# Patient Record
Sex: MALE | Race: WHITE
[De-identification: names, ages, dates, MRNs, and addresses within clinical notes are randomized per-mention and may not be internally consistent; named-entity substitution may affect disease eponyms.]

---

## 2017-07-02 ENCOUNTER — HOSPITAL ENCOUNTER (EMERGENCY)
Dept: HOSPITAL 53 - M ED | Age: 47
Discharge: HOME | End: 2017-07-02
Payer: OTHER GOVERNMENT

## 2017-07-02 VITALS — BODY MASS INDEX: 26.5 KG/M2 | WEIGHT: 174.83 LBS | HEIGHT: 68 IN

## 2017-07-02 VITALS — SYSTOLIC BLOOD PRESSURE: 117 MMHG | DIASTOLIC BLOOD PRESSURE: 66 MMHG

## 2017-07-02 DIAGNOSIS — H10.31: ICD-10-CM

## 2017-07-02 DIAGNOSIS — B34.9: Primary | ICD-10-CM

## 2017-07-02 LAB
BASOPHILS # BLD AUTO: 0.1 K/MM3 (ref 0–0.2)
BASOPHILS NFR BLD AUTO: 0.9 % (ref 0–1)
CONTROL LINE MONO: (no result)
EOSINOPHIL # BLD AUTO: 0.2 K/MM3 (ref 0–0.5)
EOSINOPHIL NFR BLD AUTO: 1.8 % (ref 0–3)
ERYTHROCYTE [DISTWIDTH] IN BLOOD BY AUTOMATED COUNT: 12.5 % (ref 11.5–14.5)
LARGE UNSTAINED CELL #: 0.1 K/MM3 (ref 0–0.4)
LARGE UNSTAINED CELL %: 0.7 % (ref 0–4)
LYMPHOCYTES # BLD AUTO: 1.6 K/MM3 (ref 1.5–4.5)
LYMPHOCYTES NFR BLD AUTO: 17.5 % (ref 24–44)
MCH RBC QN AUTO: 31.3 PG (ref 27–33)
MCHC RBC AUTO-ENTMCNC: 34 G/DL (ref 32–36.5)
MCV RBC AUTO: 92.2 FL (ref 80–96)
MONOCYTES # BLD AUTO: 0.3 K/MM3 (ref 0–0.8)
MONOCYTES NFR BLD AUTO: 3.7 % (ref 0–5)
NEUTROPHILS # BLD AUTO: 6.5 K/MM3 (ref 1.8–7.7)
NEUTROPHILS NFR BLD AUTO: 75.4 % (ref 36–66)
PLATELET # BLD AUTO: 250 K/MM3 (ref 150–450)
WBC # BLD AUTO: 8.7 K/MM3 (ref 4–10)

## 2017-12-21 ENCOUNTER — HOSPITAL ENCOUNTER (OUTPATIENT)
Dept: HOSPITAL 53 - M CARPUL | Age: 47
End: 2017-12-21
Payer: OTHER GOVERNMENT

## 2017-12-21 DIAGNOSIS — R93.1: Primary | ICD-10-CM

## 2017-12-21 NOTE — ECHO
DATE OF PROCEDURE: 12/21/2017

 

AGE: 47

GENDER: Male

HEIGHT: 69 inches

WEIGHT: 178 pounds

BODY SURFACE AREA: 1.97 m2

 

PATIENT LOCATION: Outpatient

 

REFERRING PHYSICIAN:   James Michael MD

 

INDICATION: Right atrial enlargement.

 

2-D MEASUREMENTS:

RV: 3.5 cm

LV: 4.9 cm

Septum: 1.0 cm

Posterior wall: 1.0 cm

Aortic root: 3.2 cm

LA: 3.2 cm

LVEF: 65%

 

DOPPLER MEASUREMENTS:

AV: 1.2 m/s

LVOT: 1.1 m/s

LVOT diameter: 2.1 cm

MV-E: 72, A: 56, EA ratio: 1.3

Early mitral deceleration time: 194 ms

E prime 11.7, A prime 14, E/E prime ratio: 6.1

PV: 0.8 m/s

RVSP: 46 mmHg

IVC: 2.4 cm

 

COMMENTS:

Normal sinus rhythm without intraventricular conduction disturbance.

 

Normal cardiac chamber sizes and wall thickness.  On real-time imaging from the

parasternal, apical and subcostal projections wall motion appeared to be

symmetrical and normal to hyperkinetic. Normal appearing mitral valvular

apparatus and leaflet excursion with no posterior systolic buckling.  Three equal

size aortic cusps of normal thickness and cusp separation.  No apparent

intracardiac mass or pericardial effusion.  Normal aortic root size.

 

Color flow Doppler study taken from the parasternal and apical projections showed

very mild mitral and at least mild to moderate tricuspid insufficiency.  No

aortic insufficiency.

 

Guided continuous wave Doppler of his aortic valve showed a normal peak systolic

velocity against LV outflow tract obstruction.

 

Pulsed and continuous wave Doppler of his LV inflow tract taken from the apical

four-chamber projection showed normal diastolic filling velocities against mitral

stenosis.  The filling pattern was also normal.  Doppler assessment of his LV

diastolic function and estimation of his left atrial pressure were all within

normal limits.

 

Guided continuous wave Doppler of his pulmonary trunk showed a normal peak

systolic velocity against RV outflow tract obstruction.

 

Guided continuous wave Doppler of his tricuspid valve allowed our estimation of

his right ventricular systolic pressure (moderately increased).  His inferior

vena cava was at least mildly dilated with slightly reduced respiratory collapse

suggestive an elevated central venous pressure.

 

CONCLUSIONS:

Normal left ventricular size, wall thickness and wall motion.

 

Normal left atrial size and Doppler assessment of LV diastolic function and mean

left atrial pressure.

 

Normal right heart chamber sizes and contraction with Doppler evidence of

moderate pulmonary hypertension.

 

Mildly dilated IVC with reduced respiratory collapse suggestive an elevated

central venous pressure.

 

The above test findings may well be explained by obstructive sleep apnea, but the

observed pulmonary hypertension is not related to left ventricular disease.

## 2019-01-09 ENCOUNTER — HOSPITAL ENCOUNTER (OUTPATIENT)
Dept: HOSPITAL 53 - M WUC | Age: 49
End: 2019-01-09
Attending: FAMILY MEDICINE
Payer: OTHER GOVERNMENT

## 2019-01-09 DIAGNOSIS — N20.0: ICD-10-CM

## 2019-01-09 DIAGNOSIS — Z00.00: Primary | ICD-10-CM

## 2019-01-09 LAB
ALBUMIN SERPL BCG-MCNC: 4.1 GM/DL (ref 3.2–5.2)
ALT SERPL W P-5'-P-CCNC: 42 U/L (ref 12–78)
APPEARANCE UR: CLEAR
BACTERIA UR QL AUTO: NEGATIVE
BASOPHILS # BLD AUTO: 0.1 10^3/UL (ref 0–0.2)
BASOPHILS NFR BLD AUTO: 1.1 % (ref 0–1)
BILIRUB SERPL-MCNC: 0.5 MG/DL (ref 0.2–1)
BILIRUB UR QL STRIP.AUTO: NEGATIVE
BUN SERPL-MCNC: 9 MG/DL (ref 7–18)
CALCIUM SERPL-MCNC: 8.9 MG/DL (ref 8.5–10.1)
CHLORIDE SERPL-SCNC: 104 MEQ/L (ref 98–107)
CHOLEST SERPL-MCNC: 158 MG/DL (ref ?–200)
CHOLEST/HDLC SERPL: 3.67 {RATIO} (ref ?–5)
CO2 SERPL-SCNC: 29 MEQ/L (ref 21–32)
CREAT SERPL-MCNC: 0.92 MG/DL (ref 0.7–1.3)
EOSINOPHIL # BLD AUTO: 0.1 10^3/UL (ref 0–0.5)
EOSINOPHIL NFR BLD AUTO: 2.1 % (ref 0–3)
GFR SERPL CREATININE-BSD FRML MDRD: > 60 ML/MIN/{1.73_M2} (ref 60–?)
GLUCOSE SERPL-MCNC: 95 MG/DL (ref 70–100)
GLUCOSE UR QL STRIP.AUTO: NEGATIVE MG/DL
HCT VFR BLD AUTO: 42.8 % (ref 42–52)
HDLC SERPL-MCNC: 43 MG/DL (ref 40–?)
HGB BLD-MCNC: 14.6 G/DL (ref 13.5–17.5)
HGB UR QL STRIP.AUTO: (no result)
KETONES UR QL STRIP.AUTO: NEGATIVE MG/DL
LDLC SERPL CALC-MCNC: 94 MG/DL (ref ?–100)
LEUKOCYTE ESTERASE UR QL STRIP.AUTO: NEGATIVE
LYMPHOCYTES # BLD AUTO: 2.1 10^3/UL (ref 1.5–4.5)
LYMPHOCYTES NFR BLD AUTO: 33.3 % (ref 24–44)
MCH RBC QN AUTO: 32 PG (ref 27–33)
MCHC RBC AUTO-ENTMCNC: 34.1 G/DL (ref 32–36.5)
MCV RBC AUTO: 93.9 FL (ref 80–96)
MONOCYTES # BLD AUTO: 0.4 10^3/UL (ref 0–0.8)
MONOCYTES NFR BLD AUTO: 5.8 % (ref 0–5)
MUCOUS THREADS URNS QL MICRO: (no result)
NEUTROPHILS # BLD AUTO: 3.6 10^3/UL (ref 1.8–7.7)
NEUTROPHILS NFR BLD AUTO: 57.4 % (ref 36–66)
NITRITE UR QL STRIP.AUTO: NEGATIVE
NONHDLC SERPL-MCNC: 115 MG/DL
PH UR STRIP.AUTO: 5 UNITS (ref 5–9)
PLATELET # BLD AUTO: 280 10^3/UL (ref 150–450)
POTASSIUM SERPL-SCNC: 4.3 MEQ/L (ref 3.5–5.1)
PROT SERPL-MCNC: 7.1 GM/DL (ref 6.4–8.2)
PROT UR QL STRIP.AUTO: NEGATIVE MG/DL
RBC # BLD AUTO: 4.56 10^6/UL (ref 4.3–6.1)
RBC # UR AUTO: 5 /HPF (ref 0–3)
SODIUM SERPL-SCNC: 138 MEQ/L (ref 136–145)
SP GR UR STRIP.AUTO: 1.02 (ref 1–1.03)
SQUAMOUS #/AREA URNS AUTO: 0 /HPF (ref 0–6)
TRIGL SERPL-MCNC: 105 MG/DL (ref ?–150)
UROBILINOGEN UR QL STRIP.AUTO: 0.2 MG/DL (ref 0–2)
WBC # BLD AUTO: 6.3 10^3/UL (ref 4–10)
WBC #/AREA URNS AUTO: 1 /HPF (ref 0–3)

## 2019-01-30 ENCOUNTER — HOSPITAL ENCOUNTER (OUTPATIENT)
Dept: HOSPITAL 53 - M SMT | Age: 49
End: 2019-01-30
Attending: NURSE PRACTITIONER
Payer: OTHER GOVERNMENT

## 2019-01-30 DIAGNOSIS — Z12.5: Primary | ICD-10-CM

## 2019-01-30 PROCEDURE — 36415 COLL VENOUS BLD VENIPUNCTURE: CPT

## 2019-02-11 ENCOUNTER — HOSPITAL ENCOUNTER (OUTPATIENT)
Dept: HOSPITAL 53 - M SMT | Age: 49
End: 2019-02-11
Attending: NURSE PRACTITIONER
Payer: COMMERCIAL

## 2019-02-11 DIAGNOSIS — Z30.09: Primary | ICD-10-CM

## 2019-02-11 LAB
COLOR SMN: (no result)
SPECIMEN VOL SMN: 5 ML (ref 4–5)
VISC SMN QL: (no result)
WBC # SMN AUTO: (no result) 10*3/UL